# Patient Record
Sex: FEMALE | Race: OTHER
[De-identification: names, ages, dates, MRNs, and addresses within clinical notes are randomized per-mention and may not be internally consistent; named-entity substitution may affect disease eponyms.]

---

## 2015-11-10 VITALS
DIASTOLIC BLOOD PRESSURE: 79 MMHG | DIASTOLIC BLOOD PRESSURE: 79 MMHG | SYSTOLIC BLOOD PRESSURE: 138 MMHG | SYSTOLIC BLOOD PRESSURE: 138 MMHG | DIASTOLIC BLOOD PRESSURE: 79 MMHG | SYSTOLIC BLOOD PRESSURE: 138 MMHG

## 2017-04-05 ENCOUNTER — HOSPITAL ENCOUNTER (OUTPATIENT)
Dept: HOSPITAL 61 - RAD | Age: 47
Discharge: HOME | End: 2017-04-05
Attending: FAMILY MEDICINE
Payer: COMMERCIAL

## 2017-04-05 DIAGNOSIS — R07.9: Primary | ICD-10-CM

## 2017-04-05 PROCEDURE — 71020: CPT

## 2017-04-05 NOTE — RAD
Chest, 2 views, 4/5/2017:



History: Chest pain



The heart size is and pulmonary vascularity are normal. The lungs are clear.

There is no evidence of pleural fluid. Minimal spurring is present in the

spine.



IMPRESSION: No acute cardiopulmonary abnormality is detected.

## 2018-09-15 ENCOUNTER — HOSPITAL ENCOUNTER (OUTPATIENT)
Dept: HOSPITAL 63 - RAD | Age: 48
Discharge: HOME | End: 2018-09-15
Payer: COMMERCIAL

## 2018-09-15 DIAGNOSIS — M75.81: ICD-10-CM

## 2018-09-15 DIAGNOSIS — M19.011: Primary | ICD-10-CM

## 2018-09-15 PROCEDURE — 73030 X-RAY EXAM OF SHOULDER: CPT

## 2018-09-15 NOTE — RAD
Right shoulder, 3 views, 9/15/2018:

 

HISTORY: Shoulder pain

 

No fracture or dislocation is identified. There is mild degenerative 

change at the AC joint. There is mild spurring along the inferior glenoid 

rim. The periarticular soft tissues are unremarkable.

 

IMPRESSION:

1. Mild degenerative change.

2. No acute bony abnormality is detected.

 

Electronically signed by: Rick Moritz, MD (9/15/2018 12:56 PM) [facility]

## 2018-09-24 ENCOUNTER — HOSPITAL ENCOUNTER (OUTPATIENT)
Dept: HOSPITAL 61 - KCIC | Age: 48
Discharge: HOME | End: 2018-09-24
Attending: ORTHOPAEDIC SURGERY
Payer: COMMERCIAL

## 2018-09-24 DIAGNOSIS — M25.511: Primary | ICD-10-CM

## 2018-09-24 DIAGNOSIS — Z88.1: ICD-10-CM

## 2018-09-24 DIAGNOSIS — Z88.8: ICD-10-CM

## 2018-09-24 PROCEDURE — 77002 NEEDLE LOCALIZATION BY XRAY: CPT

## 2018-09-24 PROCEDURE — 20610 DRAIN/INJ JOINT/BURSA W/O US: CPT

## 2018-09-24 PROCEDURE — 20551 NJX 1 TENDON ORIGIN/INSJ: CPT

## 2018-09-25 NOTE — [facility]
Right biceps tendon injection under fluoroscopy 9/24/2018

 

CLINICAL HISTORY: Severe right arm pain and popping.

 

TECHNIQUE: After the risks and benefits of the procedure were explained to

the patient, written informed consent was obtained. The right arm was 

externally rotated. The anterior aspect of the right shoulder was prepped 

and draped in sterile fashion. 1 percent lidocaine was used as local 

anesthetic. Under fluoroscopic guidance a 22-gauge needle was advanced 

into the humeral head near the lesser tuberosity. Following this the 

needle was retracted slightly and a solution containing 1 cc of lidocaine,

1 cc Bupivacaine and 1 cc of Depo-Medrol were injected through the needle.

Following this the needle was removed and hemostasis achieved at puncture 

site. The patient tolerated the procedure well there were no immediate 

complications. The total fluoroscopic time for this study was 22 seconds. 

A single fluoroscopic captured digital spot radiograph of the right 

shoulder was obtained.

 

IMPRESSION: Technically successful fluoroscopically guided injection of 

the right biceps tendon as outlined above.

 

Electronically signed by: Jeff Ramirez MD (9/25/2018 9:26 AM) [facility]

## 2019-03-28 ENCOUNTER — HOSPITAL ENCOUNTER (OUTPATIENT)
Dept: HOSPITAL 61 - MRI | Age: 49
Discharge: HOME | End: 2019-03-28
Attending: ORTHOPAEDIC SURGERY
Payer: COMMERCIAL

## 2019-03-28 DIAGNOSIS — M75.81: Primary | ICD-10-CM

## 2019-03-28 DIAGNOSIS — Z88.1: ICD-10-CM

## 2019-03-28 DIAGNOSIS — M75.101: ICD-10-CM

## 2019-03-28 PROCEDURE — 73221 MRI JOINT UPR EXTREM W/O DYE: CPT

## 2019-03-28 NOTE — RAD
Examination: MRI of the right shoulder without contrast

 

HISTORY: History of right shoulder pain 

 

Comparison: None available 

 

Technique: Multiplanar, multisequence MR imaging of the right shoulder 

were performed without contrast

 

 

Findings:

 

 

The long head of the biceps tendon is within the bicipital groove. The 

attachment of the long head of the biceps tendon to the superior labral 

anchor grossly appears intact. The attachment of the subscapularis tendon 

appears intact. There is focus of full-thickness tear of the supraspinatus

tendon just proximal to its attachment to the greater tuberosity with 

extension of fluid in the subacromial /subdeltoid bursa. There is moderate

tendinosis of the supraspinatus, infraspinatus tendons.

 

The teres minor tendon appears intact.

 

The visualized labrum grossly appears unremarkable.

 

The acromion is type II.

 

Mild degenerative changes identified in the acromioclavicular joint.

 

The muscle bulk grossly appears unremarkable. There is mild obscuration of

fat in the interval.

 

 

IMPRESSION:

 

1. Small focus of full-thickness tear identified in the supraspinatus 

tendon with extension of fluid in the subacromial subdeltoid bursa.

 

2. Moderate to severe tendinosis of the supraspinatus, infraspinatus 

tendons.

 

3. Mild obscuration of fat in the rotator interval. Correlate for adhesive

capsulitis.

 

Electronically signed by: Trino Carnes MD (3/28/2019 4:50 PM) [facility]

## 2019-04-15 ENCOUNTER — HOSPITAL ENCOUNTER (OUTPATIENT)
Dept: HOSPITAL 61 - SURG | Age: 49
Discharge: HOME | End: 2019-04-15
Attending: ORTHOPAEDIC SURGERY
Payer: COMMERCIAL

## 2019-04-15 VITALS — WEIGHT: 125 LBS | HEIGHT: 61 IN | BODY MASS INDEX: 23.6 KG/M2

## 2019-04-15 VITALS — DIASTOLIC BLOOD PRESSURE: 64 MMHG | SYSTOLIC BLOOD PRESSURE: 114 MMHG

## 2019-04-15 DIAGNOSIS — M75.21: ICD-10-CM

## 2019-04-15 DIAGNOSIS — Z72.89: ICD-10-CM

## 2019-04-15 DIAGNOSIS — Z87.891: ICD-10-CM

## 2019-04-15 DIAGNOSIS — J45.909: ICD-10-CM

## 2019-04-15 DIAGNOSIS — M75.111: Primary | ICD-10-CM

## 2019-04-15 DIAGNOSIS — Z88.1: ICD-10-CM

## 2019-04-15 DIAGNOSIS — Z79.899: ICD-10-CM

## 2019-04-15 DIAGNOSIS — Z98.890: ICD-10-CM

## 2019-04-15 DIAGNOSIS — Z83.3: ICD-10-CM

## 2019-04-15 DIAGNOSIS — M25.311: ICD-10-CM

## 2019-04-15 DIAGNOSIS — K21.9: ICD-10-CM

## 2019-04-15 DIAGNOSIS — Z82.49: ICD-10-CM

## 2019-04-15 LAB — U PREG PATIENT: NEGATIVE

## 2019-04-15 PROCEDURE — A7015 AEROSOL MASK USED W NEBULIZE: HCPCS

## 2019-04-15 PROCEDURE — C1782 MORCELLATOR: HCPCS

## 2019-04-15 PROCEDURE — 81025 URINE PREGNANCY TEST: CPT

## 2019-04-15 PROCEDURE — 23430 REPAIR BICEPS TENDON: CPT

## 2019-04-15 PROCEDURE — 29827 SHO ARTHRS SRG RT8TR CUF RPR: CPT

## 2019-04-15 NOTE — PDOC4
Operative Note


Operative Note


Surgeon: Ian Huddleston





Assistant: Aye Velarde, certified surgical first assistant was necessary 

manipulation of the arm as well as assisting holding the arthroscopic equipment 

and wound closure.





Preoperative diagnosis:  #1 Right shoulder rotator cuff tear


#2 right shoulder biceps tendinitis





Postoperative diagnosis: Same





Procedure performed:  #1 Arthroscopic right shoulder rotator cuff repair


#2 open subpectoral biceps tenodesis





Anesthesia: Gen. plus regional nerve block





Findings:


#1 Superiorly, there was some degenerative labral fraying, labrum was intact 

circumferentially


#2 glenohumeral cartilage unremarkable


#3 no loose bodies


#4 Incomplete tear of rotator cuff, leading edge


#5 biceps tendinitis





Blood loss: 25mL





Components inserted: [employer] large Suturefix anchor for biceps tendodesis

; Helacoil anchor for rotator cuff repair





Reason for procedure: Patient is a very pleasant female who had anterior 

shoulder pain and has tried conservative therapies including injections, 

physical therapy and anti-inflammatories. Because of persistent pain and 

dysfunction, we discussed proceeding with surgery. Clinical and radiographic 

examination, including MRI, were consistent with the preoperative diagnosis. We 

had a discussion of the risks, benefits, alternatives the above surgery and he 

wished to proceed.


Description of procedure: Patient was greeted in the preoperative holding area 

where the correct extremity was verified and marked. They were taken to the 

preoperative holding area where the anesthesiology team placed a regional nerve 

block. The patient was then taken back to the operative suite and antibiotics 

were started as they were brought back. Once in the operative room, the patient 

was transferred gently supine to the operating room table after successful 

induction of a general anesthetic. After this, she was sat up in a beachchair 

position maintaining his C-spine in neutral position, large pad under her legs, 

she was secured to the bed. We then prepped and draped her right upper 

extremity and shoulder girdle in our usual sterile fashion, we conducted our 

standard preoperative timeout. I palpated and marked surface anatomy for my 

planned portal sites. I then used a spinal needle to localize a posterior 

superior portal and incised skin in accordance with this. After this, I 

introduced the blunt arthroscopic trocar into the glenohumeral joint followed 

by the camera. I used a spinal needle to localize an anterosuperior portal and 

incised skin in accordance with this.  I then introduced my arthroscopic probe 

and conducted my diagnostic arthroscopy with the above-noted findings. After 

inspecting his biceps tendon and noting the pathology, and taking into account 

his physical examination, I elected to perform my biceps tenodesis next. I 

repositioned the arm after removing the arthroscopic instrumentation and 

palpated for the lower border of his pectoralis major tendon. I made a vertical 

incision through an axillary skin fold referencing the lower border of the 

pectoralis major tendon. I dissected subcutaneous cutaneous tissue and used a 

Metzenbaum to incise he fascia and bluntly dissected over the top of the 

humerus. I placed my 90 retractor taking care not to entrap the biceps tendon. 

I opened up the bicipital sheath and identified the tendon. I then placed my 

large suture fix anchor and passed the limbs through in a lasso type 

configuration with a bird beak passer. I then tied these down securely. I then 

clamped the biceps tendon proximal to my suture knots and transected the biceps 

tendon with a scalpel. I then repositioned the arm and introduce the camera 

into the glenohumeral joint again. After this, I used the Metzenbaums to 

release the biceps tendon from the labrum and the shaver to debride the stump. 

I then inspected his rotator cuff and noted some tendinosis type changes at the 

supraspinatus and passed a PDS suture through using a spinal needle and 

retrieved it through the anterosuperior portal as well. I debrided some frayed 

tissue around the superior labrum. After this, I repositioned the camera into 

the subacromial space and performed a bursectomy with combination of shaver and 

electrocautery device. I then identified the rotator cuff tear and I debrided 

the pathologic tendon and prepared my footprint. I then placed my helacoil 

anchor and shuttled limbs through in a simple configuration. I tied these down 

with arthroscopic knot-tying techniques. The tear was stable to probing and to 

gentle rotation of the arm. I then removed all loose bony debris and the excess 

arthroscopic fluid. I took my final pictures prior to this. After this, all the 

excess fluid and instrumentation was removed. The portals were closed with 

simple interrupted 3-0 nylon.  The axillary incision was closed with inverted 

interrupted 2-0 Vicryl followed by running 4-0 Monocryl. Steri-Strips were 

applied. Sterile dressing was applied followed by an abduction pillow sling. 

Patient tolerated surgery well. No complications. All counts correct 2 prior 

to wound closure. At the conclusion, she was laid supine and transferred gently 

supine to the recovery room cart and taken to the PACU in a stable and 

extubated condition. Postoperative plan is discharge him home, nonweightbearing 

for 6 weeks. Well get her started on physical therapy. She will follow up with 

me in 2 weeks, sooner should a problem arise.











IAN HUDDLESTON II, MD Apr 15, 2019 13:52

## 2019-04-15 NOTE — DISCH
DISCHARGE INSTRUCTIONS


Condition on Discharge


Condition on Discharge:  Stable





Activity After Discharge


Activity Instructions for Disc:  Other, see below


Other activity instructions:  arm to remain in sling


Bathing Instructions:  Shower-keep dressing dry


Weight Bearing Status after Di:  Non weight bearing





Wound Incision Care


Wound/Incision Care:  Ice to area for comfort, Keep wound/cast CDI, Change 

dressing


Other wound/incision instructi:  ok to change dressing in 2 days





Contacting the DRNirmala after DC


Call your doctor for:  Concerns you may have





Follow-Up


Follow up with:  Leonardo in 2 wks











GABBI HUDDLESTON II, MD Apr 15, 2019 13:55

## 2019-06-28 ENCOUNTER — HOSPITAL ENCOUNTER (OUTPATIENT)
Dept: HOSPITAL 61 - SURG | Age: 49
Discharge: HOME | End: 2019-06-28
Attending: ORTHOPAEDIC SURGERY
Payer: COMMERCIAL

## 2019-06-28 VITALS
DIASTOLIC BLOOD PRESSURE: 78 MMHG | SYSTOLIC BLOOD PRESSURE: 130 MMHG | DIASTOLIC BLOOD PRESSURE: 78 MMHG | SYSTOLIC BLOOD PRESSURE: 130 MMHG

## 2019-06-28 DIAGNOSIS — Z88.1: ICD-10-CM

## 2019-06-28 DIAGNOSIS — Z72.89: ICD-10-CM

## 2019-06-28 DIAGNOSIS — Z98.890: ICD-10-CM

## 2019-06-28 DIAGNOSIS — J45.909: ICD-10-CM

## 2019-06-28 DIAGNOSIS — M25.611: Primary | ICD-10-CM

## 2019-06-28 LAB — U PREG PATIENT: NEGATIVE

## 2019-06-28 PROCEDURE — 81025 URINE PREGNANCY TEST: CPT

## 2019-06-28 PROCEDURE — 23700 MNPJ ANES SHO JT FIXJ APRATS: CPT

## 2019-06-28 NOTE — DISCH
DISCHARGE INSTRUCTIONS


Condition on Discharge


Condition on Discharge:  Stable





Activity After Discharge


Activity Instructions for Disc:  Other ROM activity


Other activity instructions:  twice daily ROM exercises


Weight Bearing Status after Di:  As tolerated





Diet after Discharge


Diet after Discharge:  Regular





Wound Incision Care


Wound/Incision Care:  Ice to area for comfort





Contacting the DRNirmala after DC


Call your doctor for:  Concerns you may have





Follow-Up


Follow up with:  Leonardo in 2 wks


Follow Up With:  GABBI CRUZ II, MD        Jun 28, 2019 07:43

## 2020-06-19 ENCOUNTER — HOSPITAL ENCOUNTER (OUTPATIENT)
Dept: HOSPITAL 61 - KCIC MAMMO | Age: 50
Discharge: HOME | End: 2020-06-19
Attending: NURSE PRACTITIONER
Payer: COMMERCIAL

## 2020-06-19 DIAGNOSIS — Z12.31: Primary | ICD-10-CM

## 2020-06-19 PROCEDURE — 77067 SCR MAMMO BI INCL CAD: CPT

## 2020-06-19 NOTE — [facility]
Bilateral digital screening mammograms:

 

Reason for examination: Routine screening.

 

No previous examinations available for comparison.

 

Interpretation was made with the benefit of CAD.

 

The skin and nipples show no abnormalities. No abnormal axillary lymph 

nodes are seen. The breast parenchyma is extremely dense. (Breast density:

Category D.) There are no dominant masses, suspicious calcifications or 

architectural distortion.

 

Impression:

 

No evidence of malignancy. Recommend routine screening.

 

Your patient's mammogram demonstrates that she has dense breast tissue 

(breast density category C or D), which could hide abnormalities, and if 

she has other risk factors for breast cancer that have been identified, 

she might benefit from supplemental screening tests that may be suggested 

by you as her ordering physician. Dense breast tissue, in and of itself, 

is a relatively common condition. Therefore, this information is not 

provided to cause undue concern, but rather to raise your awareness and to

promote discussion with your patient regarding the presence of other risk 

factors, in addition to dense breast tissue. Your patient's mammography 

results will be sent to her. 

 

BI-RAD Category 1: Negative.

 

"Our facility is accredited by the [employer] 

Mammography Program."

 

This patient's information has been entered into a reminder system for the

patient to be notified with the results of her examination and a target 

date for the next mammogram.

 

Electronically signed by: Rosa Downing MD (6/19/2020 2:52 PM) [email or web address]